# Patient Record
Sex: FEMALE | Race: WHITE | Employment: FULL TIME | URBAN - METROPOLITAN AREA
[De-identification: names, ages, dates, MRNs, and addresses within clinical notes are randomized per-mention and may not be internally consistent; named-entity substitution may affect disease eponyms.]

---

## 2021-07-05 ENCOUNTER — HOSPITAL ENCOUNTER (EMERGENCY)
Age: 56
Discharge: HOME | End: 2021-07-05
Attending: STUDENT IN AN ORGANIZED HEALTH CARE EDUCATION/TRAINING PROGRAM
Payer: COMMERCIAL

## 2021-07-05 ENCOUNTER — APPOINTMENT (OUTPATIENT)
Dept: VASCULAR SURGERY | Age: 56
End: 2021-07-05
Attending: EMERGENCY MEDICINE
Payer: COMMERCIAL

## 2021-07-05 VITALS
OXYGEN SATURATION: 99 % | SYSTOLIC BLOOD PRESSURE: 166 MMHG | RESPIRATION RATE: 16 BRPM | TEMPERATURE: 97.6 F | HEART RATE: 74 BPM | DIASTOLIC BLOOD PRESSURE: 84 MMHG

## 2021-07-05 DIAGNOSIS — I80.02 THROMBOPHLEBITIS OF SUPERFICIAL VEINS OF LEFT LOWER EXTREMITY: Primary | ICD-10-CM

## 2021-07-05 PROCEDURE — 93971 EXTREMITY STUDY: CPT

## 2021-07-05 PROCEDURE — 99282 EMERGENCY DEPT VISIT SF MDM: CPT

## 2021-07-05 NOTE — ED PROVIDER NOTES
Please note that this dictation was completed with [employer], the computer voice recognition software.  Quite often unanticipated grammatical, syntax, homophones, and other interpretive errors are inadvertently transcribed by the computer software.  Please disregard these errors.  Please excuse any errors that have escaped final proofreading. Patient is a 78-year-old female with history of hypothyroidism and superficial thrombophlebitis presenting to emergency department for evaluation of left leg pain and redness with onset several days ago. She states that she notices while at the beach, she did have about a 5-hour drive to and from the beach. States that about 20 years ago she had a superficial thrombophlebitis during pregnancy which was treated with warm compresses, was never on any anticoagulants. She denies any significant leg swelling. Denies any trauma to the area. Denies headache, dizziness, chest pain, shortness of breath, numbness, tingling, trouble walking, or any other medical complaints at this time. Denies history of DVT or PE. Past Medical History:   Diagnosis Date    Endocrine disease        History reviewed. No pertinent surgical history. History reviewed. No pertinent family history.     Social History     Socioeconomic History    Marital status: Not on file     Spouse name: Not on file    Number of children: Not on file    Years of education: Not on file    Highest education level: Not on file   Occupational History    Not on file   Tobacco Use    Smoking status: Not on file   Substance and Sexual Activity    Alcohol use: Not on file    Drug use: Not on file    Sexual activity: Not on file   Other Topics Concern    Not on file   Social History Narrative    Not on file     Social Determinants of Health     Financial Resource Strain:     Difficulty of Paying Living Expenses:    Food Insecurity:     Worried About Running Out of Food in the Last Year:     Jerrod bautista Food in the Last Year:    Transportation Needs:     Lack of Transportation (Medical):  Lack of Transportation (Non-Medical):    Physical Activity:     Days of Exercise per Week:     Minutes of Exercise per Session:    Stress:     Feeling of Stress :    Social Connections:     Frequency of Communication with Friends and Family:     Frequency of Social Gatherings with Friends and Family:     Attends Christianity Services:     Active Member of Clubs or Organizations:     Attends Club or Organization Meetings:     Marital Status:    Intimate Partner Violence:     Fear of Current or Ex-Partner:     Emotionally Abused:     Physically Abused:     Sexually Abused: ALLERGIES: Sulfa (sulfonamide antibiotics)    Review of Systems   Constitutional: Negative for chills and fever. HENT: Negative for ear pain and sore throat. Eyes: Negative for visual disturbance. Respiratory: Negative for cough and shortness of breath. Cardiovascular: Positive for leg swelling. Negative for chest pain. Gastrointestinal: Negative for abdominal pain. Genitourinary: Negative for flank pain. Musculoskeletal: Positive for myalgias. Negative for back pain. Skin: Negative for color change. Neurological: Negative for dizziness and headaches. Psychiatric/Behavioral: Negative for confusion. Vitals:    07/05/21 0844   BP: (!) 166/84   Pulse: 74   Resp: 16   Temp: 97.6 °F (36.4 °C)   SpO2: 99%            Physical Exam  Vitals and nursing note reviewed. Constitutional:       General: She is not in acute distress. Appearance: Normal appearance. She is not ill-appearing. HENT:      Head: Normocephalic and atraumatic. Mouth/Throat:      Pharynx: Oropharynx is clear. Eyes:      Extraocular Movements: Extraocular movements intact. Conjunctiva/sclera: Conjunctivae normal.   Cardiovascular:      Rate and Rhythm: Normal rate and regular rhythm.       Pulses:           Dorsalis pedis pulses are 2+ on the right side and 2+ on the left side. Posterior tibial pulses are 2+ on the right side and 2+ on the left side. Pulmonary:      Effort: Pulmonary effort is normal.      Breath sounds: Normal breath sounds. Abdominal:      Palpations: Abdomen is soft. Tenderness: There is no abdominal tenderness. Musculoskeletal:         General: Normal range of motion. Cervical back: No rigidity. Right hip: Normal.      Left hip: Normal.      Right upper leg: Normal.      Left upper leg: Normal.      Right lower leg: No swelling. No edema. Left lower leg: Tenderness (well-localized area of erythema and tenderness to the left superior medial leg. No significant edema noted. Varicose veins on postierior calf and thigh. LE pulses equal. ) present. No swelling. No edema. Right ankle: Normal.      Left ankle: Normal.   Skin:     General: Skin is warm and dry. Neurological:      General: No focal deficit present. Mental Status: She is alert and oriented to person, place, and time. Psychiatric:         Mood and Affect: Mood normal.          MDM  Number of Diagnoses or Management Options  Thrombophlebitis of superficial veins of left lower extremity  Diagnosis management comments: Patient is alert, well-appearing, afebrile, vitals stable. History of superficial thrombophlebitis over 20 years ago, presenting with several days of leg tenderness and mild erythema. No leg edema. Clinically does appear similar to superficial thrombophlebitis, no evidence of cellulitis, she does have significant varicosities to the legs bilaterally which have been chronic. No chest pain, shortness of breath, or concern for PE at this time. Will obtain duplex lower extremity ultrasound to evaluate for extent of superficial thrombophlebitis versus DVT. 12:02 PM  Ultrasound shows a superficial thrombophlebitis to the great saphenous vein at the knee, proximal superficial thrombus, no DVT.   Discussed with patient, advised her on warm compresses, leg elevation, and a baby aspirin for the next week. She will follow-up with her primary care physician for repeat evaluation as needed in 1 to 2 weeks. Discussed patient with ED attending Prince Dylan MD who agrees with current management plan. 12:03 PM  Pt has been reevaluated. There are no new complaints, changes, or physical findings at this time. Medications have been reviewed w/ pt and/or family. Pt and/or family's questions have been answered. Pt and/or family expressed good understanding of the dx/tx/rx and is in agreement with plan of care. Pt instructed and agreed to f/u w/ PCP and to return to ED upon further deterioration. Pt is ready for discharge. IMPRESSION:  1. Thrombophlebitis of superficial veins of left lower extremity        PLAN:  1. There are no discharge medications for this patient.     2.   Follow-up Information     Follow up With Specialties Details Why Contact Info    Your Primary Care Provider  Schedule an appointment as soon as possible for a visit in 1 week For reevaluation and follow-up             Return to ED if worse          Procedures

## 2021-07-05 NOTE — DISCHARGE INSTRUCTIONS
Warm compresses of the area, elevate the leg. Take a baby aspirin (81 mg) once daily for the next week.     Follow-up with your primary care physician 1 to 2 weeks of your symptoms are not improved for reevaluation

## 2021-07-05 NOTE — ED TRIAGE NOTES
Patient presents from home with complaints of having a superficial blood clot in her left leg. Patient reports she has a history of this.  Patient reports additional spots on her legs that she is concerned about